# Patient Record
Sex: MALE | URBAN - METROPOLITAN AREA
[De-identification: names, ages, dates, MRNs, and addresses within clinical notes are randomized per-mention and may not be internally consistent; named-entity substitution may affect disease eponyms.]

---

## 2020-07-30 ENCOUNTER — APPOINTMENT (RX ONLY)
Dept: URBAN - METROPOLITAN AREA CLINIC 47 | Facility: CLINIC | Age: 80
Setting detail: DERMATOLOGY
End: 2020-07-30

## 2020-07-30 DIAGNOSIS — L57.8 OTHER SKIN CHANGES DUE TO CHRONIC EXPOSURE TO NONIONIZING RADIATION: ICD-10-CM | Status: INADEQUATELY CONTROLLED

## 2020-07-30 DIAGNOSIS — L57.0 ACTINIC KERATOSIS: ICD-10-CM | Status: INADEQUATELY CONTROLLED

## 2020-07-30 PROBLEM — D48.5 NEOPLASM OF UNCERTAIN BEHAVIOR OF SKIN: Status: ACTIVE | Noted: 2020-07-30

## 2020-07-30 PROCEDURE — 11102 TANGNTL BX SKIN SINGLE LES: CPT

## 2020-07-30 PROCEDURE — ? LIQUID NITROGEN

## 2020-07-30 PROCEDURE — ? SUNSCREEN RECOMMENDATIONS

## 2020-07-30 PROCEDURE — 17000 DESTRUCT PREMALG LESION: CPT | Mod: 59

## 2020-07-30 PROCEDURE — ? BIOPSY BY SHAVE METHOD

## 2020-07-30 PROCEDURE — ? COUNSELING

## 2020-07-30 PROCEDURE — 99202 OFFICE O/P NEW SF 15 MIN: CPT | Mod: 25

## 2020-07-30 PROCEDURE — 17003 DESTRUCT PREMALG LES 2-14: CPT

## 2020-07-30 ASSESSMENT — LOCATION DETAILED DESCRIPTION DERM
LOCATION DETAILED: RIGHT CENTRAL PARIETAL SCALP
LOCATION DETAILED: RIGHT SUPERIOR PARIETAL SCALP
LOCATION DETAILED: LEFT INFERIOR MEDIAL FOREHEAD
LOCATION DETAILED: LEFT SUPERIOR PARIETAL SCALP

## 2020-07-30 ASSESSMENT — PAIN INTENSITY VAS: HOW INTENSE IS YOUR PAIN 0 BEING NO PAIN, 10 BEING THE MOST SEVERE PAIN POSSIBLE?: NO PAIN

## 2020-07-30 ASSESSMENT — LOCATION SIMPLE DESCRIPTION DERM
LOCATION SIMPLE: SCALP
LOCATION SIMPLE: LEFT FOREHEAD

## 2020-07-30 ASSESSMENT — LOCATION ZONE DERM
LOCATION ZONE: SCALP
LOCATION ZONE: FACE

## 2020-07-30 NOTE — PROCEDURE: MIPS QUALITY
Quality 111:Pneumonia Vaccination Status For Older Adults: Pneumococcal Vaccination Previously Received
Detail Level: Detailed
Quality 47: Advance Care Plan: Advance Care Planning discussed and documented in the medical record; patient did not wish or was not able to name a surrogate decision maker or provide an advance care plan.
Quality 110: Preventive Care And Screening: Influenza Immunization: Influenza Immunization Administered during Influenza season

## 2020-08-20 ENCOUNTER — APPOINTMENT (RX ONLY)
Dept: URBAN - METROPOLITAN AREA CLINIC 47 | Facility: CLINIC | Age: 80
Setting detail: DERMATOLOGY
End: 2020-08-20

## 2020-08-20 DIAGNOSIS — L21.8 OTHER SEBORRHEIC DERMATITIS: ICD-10-CM

## 2020-08-20 DIAGNOSIS — L57.0 ACTINIC KERATOSIS: ICD-10-CM

## 2020-08-20 PROBLEM — D04.4 CARCINOMA IN SITU OF SKIN OF SCALP AND NECK: Status: ACTIVE | Noted: 2020-08-20

## 2020-08-20 PROCEDURE — ? CURETTAGE AND DESTRUCTION

## 2020-08-20 PROCEDURE — ? LIQUID NITROGEN

## 2020-08-20 PROCEDURE — 17272 DSTR MAL LES S/N/H/F/G 1.1-2: CPT

## 2020-08-20 PROCEDURE — ? PRESCRIPTION

## 2020-08-20 PROCEDURE — ? COUNSELING

## 2020-08-20 PROCEDURE — 17000 DESTRUCT PREMALG LESION: CPT | Mod: 59

## 2020-08-20 PROCEDURE — 99212 OFFICE O/P EST SF 10 MIN: CPT | Mod: 25

## 2020-08-20 RX ORDER — TRIAMCINOLONE ACETONIDE 1 MG/G
CREAM TOPICAL
Qty: 2 | Refills: 1 | Status: ERX | COMMUNITY
Start: 2020-08-20

## 2020-08-20 RX ADMIN — TRIAMCINOLONE ACETONIDE: 1 CREAM TOPICAL at 00:00

## 2020-08-20 ASSESSMENT — LOCATION DETAILED DESCRIPTION DERM
LOCATION DETAILED: RIGHT MEDIAL FRONTAL SCALP
LOCATION DETAILED: RIGHT MEDIAL FRONTAL SCALP

## 2020-08-20 ASSESSMENT — LOCATION ZONE DERM
LOCATION ZONE: SCALP
LOCATION ZONE: SCALP

## 2020-08-20 ASSESSMENT — LOCATION SIMPLE DESCRIPTION DERM
LOCATION SIMPLE: RIGHT SCALP
LOCATION SIMPLE: RIGHT SCALP

## 2020-08-20 ASSESSMENT — SEVERITY ASSESSMENT: HOW SEVERE IS THIS PATIENT'S CONDITION?: MILD

## 2020-08-20 NOTE — PROCEDURE: LIQUID NITROGEN
Consent: The patient's consent was obtained including but not limited to risks of crusting, scabbing, blistering, scarring, darker or lighter pigmentary change, recurrence, incomplete removal and infection.
Render Post-Care Instructions In Note?: no
Number Of Freeze-Thaw Cycles: 3 freeze-thaw cycles
Duration Of Freeze Thaw-Cycle (Seconds): 5
Post-Care Instructions: I reviewed with the patient in detail post-care instructions. Patient is to wear sunprotection, and avoid picking at any of the treated lesions. Pt may apply Vaseline to crusted or scabbing areas.
Detail Level: Detailed

## 2020-08-20 NOTE — PROCEDURE: CURETTAGE AND DESTRUCTION
Detail Level: Detailed
Biopsy Photograph Reviewed: Yes
Size Of Lesion In Cm: 0.9
Size Of Lesion After Curettage: 1.5
Add Intralesional Injection: No
Total Volume (Ccs): 1
Anesthesia Type: 1% lidocaine with epinephrine
Anesthesia Volume In Cc: 2
Cautery Type: electrodesiccation
Number Of Curettages: 3
What Was Performed First?: Curettage
Additional Information: (Optional): The wound was cleaned, and a pressure dressing was applied.  The patient received detailed post-op instructions.
Consent was obtained from the patient. The risks, benefits and alternatives to therapy were discussed in detail. Specifically, the risks of infection, scarring, bleeding, prolonged wound healing, nerve injury, incomplete removal, allergy to anesthesia and recurrence were addressed. Alternatives to ED&C, such as: surgical removal and XRT were also discussed.  Prior to the procedure, the treatment site was clearly identified and confirmed by the patient. All components of Universal Protocol/PAUSE Rule completed.
Post-Care Instructions: I reviewed with the patient in detail post-care instructions. Patient is to keep the area dry for 48 hours, and not to engage in any swimming until the area is healed. Should the patient develop any fevers, chills, bleeding, severe pain patient will contact the office immediately.
Bill As A Line Item Or As Units: Line Item

## 2021-10-30 NOTE — PROCEDURE: BIOPSY BY SHAVE METHOD
Patient Education     Hand, Foot, and Mouth Disease (Child)    Hand, foot, and mouth disease (HFMD) is an illness caused by a virus. It's usually seen in young children. This virus causes small sores in the throat, lips, cheeks, gums, and tongue. Small blisters or red spots may also appear on the palms (hands), diaper area, and soles of the feet. The child usually has a low-grade fever and poor appetite. HFMD is not a serious illness and usually goes away in 1 to 2 weeks. The painful sores in the mouth may prevent your child from eating and drinking.   It takes 3 to 5 days for the illness to appear in an exposed child. Generally, HFMD is most contagious during the first week of the illness. Sometimes children can be contagious for days or weeks after the symptoms have disappeared.   HFMD can be passed from person to person by:   · Touching your nose, mouth, or eye after touching the stool of a person who has the virus  · Touching your nose, mouth, or eye after touching fluid from the blisters or sores of an infected person  · Respiratory droplets from sneezing, coughing, or blowing your nose  · Touching contaminated objects such as toys or doorknobs  · Oral droplets from kissing  To prevent the spread of the virus, be sure to wash your hands with soap and water for at least 20 seconds. Or use an alcohol-based hand  if no soap and water are available. Always wash your hand before and after taking care of someone who is sick, before, during, and after preparing food, before eating, after using the toilet, after changing diapers, after sneezing or coughing, and after blowing your nose. Help children to learn how to correctly wash their hands.   Home care  Mouth pain  Unless your child's healthcare provider has prescribed another medicine for mouth pain:   · You can give acetaminophen or ibuprofen to ease pain, discomfort, or fever. But talk with the provider before giving your child either of these 
medicines. Ask about how much to give and how often. Don't give ibuprofen to a baby 6 months of age or younger. Also talk with your child's provider before giving these medicines if your child has chronic liver or kidney disease or ever had a stomach ulcer or gastrointestinal bleeding. Never give aspirin to anyone under 18 years of age who has a fever. It may cause Reye syndrome or death.  · You can give liquid rinses to a child older than 12 months of age. Ask your child's healthcare provider for instructions.  Feeding  Follow a soft diet with plenty of fluids to prevent dehydration. If your child doesn't want to eat solid foods, it's OK for a few days, as long as they drink lots of fluid. Cool drinks and frozen treats such as sherbet are soothing and easier to take. Don't give your child citrus juices such as orange juice or lemonade, or salty or spicy foods. These may cause more pain in the mouth sores.   Return to  or school  Children may usually return to  or school once the fever is gone and they are eating and drinking well. Contact your healthcare provider and ask when your child is able to return to  or school.   Follow up  Follow up with your child's healthcare provider, or as advised.  When to seek medical advice  Call your child's healthcare provider right away if any of these occur:  · Your child complains of pain in the back of the neck, or is difficult to arouse  · Your child has a severe headache or continued vomiting  · Your child is having trouble breathing  · Your child is drowsy or has trouble staying awake  · Your child is having trouble swallowing  · Your child still has mouth sores after 2 weeks  · Your child's symptoms are getting worse  · Your child appears to be dehydrated. Signs are dry mouth, no tears, and no pee 8 or more hours.  · Your child has a fever (see Fever and children, below)  Call 911  Call 911 if any of these occur:   · Unusual fussiness, drowsiness, or 
confusion  · Severe headache or vomiting that continues  · Trouble breathing  · Seizures  Fever and children  Use a digital thermometer to check your child’s temperature. Don’t use a mercury thermometer. There are different kinds and uses of digital thermometers. They include:   · Rectal. For children younger than 3 years, a rectal temperature is the most accurate.  · Forehead (temporal). This works for children age 3 months and older. If a child under 3 months old has signs of illness, this can be used for a first pass. The provider may want to confirm with a rectal temperature.  · Ear (tympanic). Ear temperatures are accurate after 6 months of age, but not before.  · Armpit (axillary). This is the least reliable but may be used for a first pass to check a child of any age with signs of illness. The provider may want to confirm with a rectal temperature.  · Mouth (oral). Don’t use a thermometer in your child’s mouth until he or she is at least 4 years old.  Use the rectal thermometer with care. Follow the product maker’s directions for correct use. Insert it gently. Label it and make sure it’s not used in the mouth. It may pass on germs from the stool. If you don’t feel OK using a rectal thermometer, ask the healthcare provider what type to use instead. When you talk with any healthcare provider about your child’s fever, tell him or her which type you used.   Below are guidelines to know if your young child has a fever. Your child’s healthcare provider may give you different numbers for your child. Follow your provider’s specific instructions.   Fever readings for a baby under 3 months old:   · First, ask your child’s healthcare provider how you should take the temperature.  · Rectal or forehead: 100.4°F (38°C) or higher  · Armpit: 99°F (37.2°C) or higher  Fever readings for a child age 3 months to 36 months (3 years):   · Rectal, forehead, or ear: 102°F (38.9°C) or higher  · Armpit: 101°F (38.3°C) or higher  Call 
the healthcare provider in these cases:   · Repeated temperature of 104°F (40°C) or higher in a child of any age  · Fever of 100.4° F (38° C) or higher in baby younger than 3 months  · Fever that lasts more than 24 hours in a child under age 2  · Fever that lasts for 3 days in a child age 2 or older  [employer] last reviewed this educational content on 7/1/2020 © 2000-2021 [employer]. All rights reserved. This information is not intended as a substitute for professional medical care. Always follow your healthcare professional's instructions.      Patient Instructions/Information    HAND, FOOT AND MOUTH DISEASE    Hand, foot and mouth disease is a common mild and short-lasting viral infection due to Coxsackie virus A16 or Enterovirus 71.  This disease usually affects the inside of the mouth and the palms of the hands, fingers and soles of the feet.    Young children under 10 years old are primarily affected, but it can also be seen in adults.  Most children are affected during the summer and early fall months.  This disease is very infectious, so several members of the family or a school class may be affected.  Outbreaks can occur in groups of children that are in day care centers or nursery schools.  The typical incubation period is 3-5 days following exposure to an infected person.      The symptoms of this disease are similar to that of a common cold, but with a rash.  After incubation the person will have a mild fever.  Two days after the onset of a fever, small sores develop as ulcers in the mouth, on the inner cheeks, gums and sides of the tongue. A day or so after that, you may notice small red bumps or blisters on the hands, feet, and possibly other parts of the skin such as the buttocks.  Children with hand, foot and mouth disease may be listless, feel sick and not eat well or a day or two.  The skin rash and blisters can last for 7-10 days.    How is hand, foot and mouth disease spread?  It is 
as contagious as the common cold and probably passed the same way.  It is usually spread through person-to-person contact.  It is principally spread from the feces of infected persons to the mouth of the next person.  It is also spread by the respiratory tract from mouth or respiratory secretions (nose and throat discharges), by person-to-person contact, and from saliva on hands or toys.  Direct contact with the skin blisters can also spread the virus.  The virus can be spread when people are shedding the virus;  as long as there is fluid in the blisters, these remain infectious.    There is nothing that can be done to prevent a person from getting the illness if you have been exposed, but only a fraction of the people who are exposed will get the disease.  There is no treatment required for hand, foot, and mouth disease and complications are uncommon.  One thing to remember is to leave the blisters on your hands and feet alone; they will heal much better if not popped.  But you can help prevent and control the spread of it by:    ~  Washing hands well, especially after going to the bathroom, changing diapers and handling diapers or other stool-soiled material  ~  Covering the mouth and nose when coughing or sneezing  ~  Washing toys and other surfaces that have saliva on them  ~  Children should be excluded from  or school settings if there is a fever and/or ulcers in the mouth; that is, when the child may be feeling ill    If blisters/lesions are open and weeping, children should be excluded from childcare settings until the blisters are dried and crusted.    Many people get hand, foot and mouth disease in human beings confused with Foot and Mouth Disease in cloven hooved animals.  They are TWO COMPLETELY DIFFERENT VIRUSES.  A little bit about Foot and Mouth Disease is that it is one of the most contagious animal diseases.  This disease rarely infects human beings;  the only way a human can be infected 
is through skin wounds or the oral mucosa by handling diseased stock, the virus in the laboratory, or by drinking infected milk, but not by eating meat from infected animals.  The human being infection is temporary and mild.      As you can see:  HAND FOOT AND MOUTH DISEASE IN HUMAN BEINGS IS DIFFERENT FROM FOOT AND MOUTH DISEASE IN ANIMALS.      Patient Education     Impetigo  Impetigo is a common bacterial infection of the skin that can appear on many parts of the body. It can happen to anyone, of any age, but is more common in children. For this reason, it used to be called \"school sores.\"   Causes  It’s normal to get scrapes on your body from activity or from scratching your skin. The skin normally has bacteria on it. Sometimes an impetigo infection can start on healthy skin. But it usually starts when there is an injury to the skin, or break in the skin. Although nothing usually happens, the bacteria normally on the skin can cause infection. This is the most common way people get impetigo.   Impetigo is very contagious. So once there is an infection, it needs to be treated so it doesn't get worse, spread to other areas, or to other people. Impetigo can easily be passed to other family members, friends, schoolmates, or co-workers, through scratching, rubbing, or touching an infected area. Common causes include:   · After a cold  · From another infected person  · Injury to skin such as scratches, cuts, sores or burns  · Insect bites  · Other skin problems that are infected, such as eczema or chickenpox  Symptoms  There is often a skin injury like a scratch, scrape, or insect bite that may have gone unnoticed or been ignored before the infection began. Symptoms of impetigo include:   · Red, inflamed area or rash  · One or many red bumps  · Bumps that turn into blisters filled with yellow fluid or pus  · Blisters break or leak causing honey-colored crusting or scabbing over the area  · Skin sores that spread to 
other surrounding areas  Home care  These guidelines will help you care for your infection at home.   Wound care  · Trim fingernails and cover sores with an adhesive bandage, if needed, to prevent scratching. Picking at the sores may leave a scar.  · If the infection is on or around your lips, don't lick or chew on the sores. This will make the infection worse.  · If a bandage or dressing is used, you can put a nonstick dressing over it.  · Wash your hands and your child’s hands often. This will avoid spreading the infection to other parts of the body and to other people. Don't share the infected person’s washcloths, towels, pillows, sheets, or clothes with others. Wash these items in hot water before using again.  · Clean the area several times a day. But, don’t scrub the area. The best way to clean the area is to soak the sores in warm, soapy water until they get soft enough to be wiped away. This will help remove the crust that forms from the dried liquid. In areas that you can’t soak, like the mouth or face, you can put a clean, warm washcloth over the infected are for 5 to 10 minutes at a time, until the scabs soften enough to remove.  Medicines  · You can use over-the-counter medicine as directed based on age and weight for pain, fever, fussiness, or discomfort, unless another medicine was prescribed. In infants ages 6 months and older, you may use ibuprofen or acetaminophen. If you or your child has chronic liver or kidney disease or ever had a stomach ulcer or gastrointestinal bleeding, talk with your healthcare provider before using these medicines. Also talk with your healthcare provider if your child is taking blood-thinner medicines.  · Don't give aspirin to your child. Aspirin should never be used in children ages 18 and younger who are ill with a fever. A condition called Reye syndrome may develop that can cause severe disease or death.   · Impetigo is often treated with antibiotic topical creams. 
Apply these as directed by your healthcare provider.  · If you were given oral antibiotics, take them until they are used up. It's important to finish the antibiotics even if the wound looks better to make sure the infection has cleared.    Follow-up care  Follow up with your healthcare provider if the sores continue to spread after 3 days of treatment. It will take about 7 to 10 days to heal completely.   Your child should stay out of school until completing 2 full days of antibiotic treatment and the rash is clearing.   When to seek medical advice  Call your healthcare provider right away if any of the following occur:   · Fever of 100.4°F (38°C) or higher, or as directed  · Increased amounts of fluid or pus coming from the sores  · Increasing number of sores or spreading areas of redness after 2 days of treatment with antibiotics  · Increasing swelling or pain  · Loss of appetite or vomiting  · Unusual drowsiness, weakness, or change in behavior  Emily last reviewed this educational content on 7/1/2019  © 1726-4752 [employer]. All rights reserved. This information is not intended as a substitute for professional medical care. Always follow your healthcare professional's instructions.    Patient Education     When Your Child Has Impetigo      Impetigo is a skin infection that usually appears around the nose and mouth.   Impetigo is a skin infection caused by common bacteria. It often starts in a broken area of the skin. Impetigo looks like a rash with small, red bumps or blisters. The rash may also be itchy. The bumps or blisters often pop open, becoming open sores. The sores then crust or scab over. This can give them a yellow or gold appearance.   How is impetigo diagnosed?  Impetigo is usually diagnosed by how it looks. To get more information, the healthcare provider will ask about your child’s symptoms and health history. Your child will also be examined. If needed, fluid from the infected 
Detail Level: Detailed
Depth Of Biopsy: dermis
skin can be tested (cultured) for bacteria.   How is impetigo treated?  Impetigo generally goes away within 7 days with treatment. Antibiotic ointment is prescribed for mild cases. Before applying the ointment, wash your hands first with warm water and soap. Then, gently clean the infected skin and apply the ointment. Wash your hands afterward.   Ask the healthcare provider if there are any over-the-counter medicines to treat your child. In some cases, your child will take prescribed antibiotics by mouth. Your child should take all the medicine until it's gone, even if he or she starts feeling better.   Call the healthcare provider if your child has any of the following:  · Fever (See Fever and children, below)  · Symptoms that don't improve within 48 hours of starting treatment  · Your child has had a seizure caused by the fever    Fever and children  Always use a digital thermometer to check your child’s temperature. Never use a mercury thermometer.   For infants and toddlers, be sure to use a rectal thermometer correctly. A rectal thermometer may accidentally poke a hole in (perforate) the rectum. It may also pass on germs from the stool. Always follow the product maker’s directions for proper use. If you don’t feel comfortable taking a rectal temperature, use another method. When you talk to your child’s healthcare provider, tell him or her which method you used to take your child’s temperature.   Here are guidelines for fever temperature. Ear temperatures aren’t accurate before 6 months of age. Don’t take an oral temperature until your child is at least 4 years old.   Infant under 3 months old:  · Ask your child’s healthcare provider how you should take the temperature.  · Rectal or forehead (temporal artery) temperature of 100.4°F (38°C) or higher, or as directed by the provider  · Armpit temperature of 99°F (37.2°C) or higher, or as directed by the provider  Child age 3 to 36 months:  · Rectal, forehead, or 
Was A Bandage Applied: Yes
ear temperature of 102°F (38.9°C) or higher, or as directed by the provider  · Armpit (axillary) temperature of 101°F (38.3°C) or higher, or as directed by the provider  Child of any age:  · Repeated temperature of 104°F (40°C) or higher, or as directed by the provider  · Fever that lasts more than 24 hours in a child under 2 years old. Or a fever that lasts for 3 days in a child 2 years or older.  How is impetigo prevented?  Follow these steps to keep your child from passing impetigo on to others:  · Cut your child’s fingernails short to discourage scratching the infected skin.  · Teach your child to wash his or her hands with soap and warm water often.  · Wash your child’s bed linens, towels, and clothing daily until the infection goes away.  Handwashing is especially important before eating or handling food, after using the bathroom, and after touching the infected skin.   [employer] last reviewed this educational content on 6/1/2019 © 2000-2020 [employer]. All rights reserved. This information is not intended as a substitute for professional medical care. Always follow your healthcare professional's instructions.    For any additional itch relief please consider antihistamine use such as Zyrtec or Xyzal or Claritin from over the counter.      [facility]    Monday - Friday 8:00 a.m. - 8:00 p.m.  Saturday - Sunday 8:00 a.m. - 4:00 p.m.    -*-*-*-*-*-*-*-  The [facility] is CLOSED on SUNDAYS  -*-*-*-*-*-*-*-    [email or web address]  See current wait times for [facility] in real-time  Reserve your waiting-room spot in line     [email or web address]  [place] for the patient portal  See test results and make virtual visits with your primary care provider    ----------------  Thank you for choosing [facility] today.  We hope you had a pleasant experience and we look forward to serving your future needs.  If you receive a survey in 
Size Of Lesion In Cm: 0
the mail about today's services, we hope that you will take a few minutes to let us know about your experience.    · If you have any questions about your VISIT, please call [phone]    · If you have any questions about your BILL, please call [phone]    · If you need a copy of your MEDICAL RECORD, please call [phone] or email [email or web address]    --------------------------------------------------------------------------------------------------------------  UNLESS OTHERWISE INSTRUCTED BY YOUR URGENT CARE PROVIDER TODAY, all follow-up for your medical issues should be managed by your primary care provider. The Urgent Care does not manage chronic medical issues or refill medications. You are responsible for scheduling and keeping any necessary follow-up visits with your primary care provider after this visit today.   --------------------------------------------------------------------------------------------------------------  IF YOU WERE PRESCRIBED AN ANTIBIOTIC TODAY: We recommend taking an over-the-counter probiotic (Such as Florajen 3 for adults, or [id number] for children -- AVAILABLE IN THE [facility] and other local pharmacies too) once a day for the entire duration of your antibiotics, and continuing it for 2 weeks after the antibiotics are finished. This will help reduce your chance of developing antibiotic-related diarrhea and/or yeast infections.  --------------------------------------------------------------------------------------------------------------    
Biopsy Type: H and E
Biopsy Method: Personna blade
Anesthesia Type: 1% lidocaine with epinephrine
Anesthesia Volume In Cc (Will Not Render If 0): 0.5
Hemostasis: Electrodesiccation
Wound Care: Aquaphor
Dressing: bandage
Destruction After The Procedure: No
Type Of Destruction Used: Curettage
Cryotherapy Text: The wound bed was treated with cryotherapy after the biopsy was performed.
Electrodesiccation Text: The wound bed was treated with electrodesiccation after the biopsy was performed.
Electrodesiccation And Curettage Text: The wound bed was treated with electrodesiccation and curettage after the biopsy was performed.
Silver Nitrate Text: The wound bed was treated with silver nitrate after the biopsy was performed.
Lab: [id number]
Consent: Written consent was obtained and risks were reviewed including but not limited to scarring, infection, bleeding, scabbing, incomplete removal, nerve damage and allergy to anesthesia.
Post-Care Instructions: I reviewed with the patient in detail post-care instructions. Patient is to keep the biopsy site dry overnight, and then apply bacitracin twice daily until healed. Patient may apply hydrogen peroxide soaks to remove any crusting.
Notification Instructions: Patient will be notified of biopsy results. However, patient instructed to call the office if not contacted within 2 weeks.
Billing Type: Third-Party Bill
Information: Selecting Yes will display possible errors in your note based on the variables you have selected. This validation is only offered as a suggestion for you. PLEASE NOTE THAT THE VALIDATION TEXT WILL BE REMOVED WHEN YOU FINALIZE YOUR NOTE. IF YOU WANT TO FAX A PRELIMINARY NOTE YOU WILL NEED TO TOGGLE THIS TO 'NO' IF YOU DO NOT WANT IT IN YOUR FAXED NOTE.